# Patient Record
Sex: MALE | Race: WHITE | URBAN - METROPOLITAN AREA
[De-identification: names, ages, dates, MRNs, and addresses within clinical notes are randomized per-mention and may not be internally consistent; named-entity substitution may affect disease eponyms.]

---

## 2017-01-06 DIAGNOSIS — E11.9 TYPE 2 DIABETES MELLITUS WITHOUT COMPLICATION, WITHOUT LONG-TERM CURRENT USE OF INSULIN (H): Primary | ICD-10-CM

## 2017-01-06 RX ORDER — LISINOPRIL 10 MG/1
10 TABLET ORAL DAILY
Qty: 30 TABLET | Refills: 0 | Status: SHIPPED | OUTPATIENT
Start: 2017-01-06

## 2017-04-23 DIAGNOSIS — E11.9 TYPE 2 DIABETES MELLITUS WITHOUT COMPLICATION, WITHOUT LONG-TERM CURRENT USE OF INSULIN (H): ICD-10-CM

## 2017-04-25 RX ORDER — GLIMEPIRIDE 1 MG/1
TABLET ORAL
Qty: 30 TABLET | Refills: 0 | OUTPATIENT
Start: 2017-04-25

## 2017-04-25 NOTE — TELEPHONE ENCOUNTER
glimiperide         Last Written Prescription Date: 12/30/16  Last Fill Quantity: 30, # refills: 0  Last Office Visit with [facility] [place] or [facility] prescribing provider:  1/11/16        BP Readings from Last 3 Encounters:   04/25/16 130/78   04/17/16 129/79   02/12/16 130/80     No results found for: MICROL  No results found for: UMALCR  Creatinine   Date Value Ref Range Status   12/14/2015 0.73 0.66 - 1.25 mg/dL Final   ]  GFR Estimate   Date Value Ref Range Status   12/14/2015 >90  Non  GFR Calc   >60 mL/min/1.7m2 Final     GFR Estimate If Black   Date Value Ref Range Status   12/14/2015 >90   GFR Calc   >60 mL/min/1.7m2 Final     Lab Results   Component Value Date    CHOL 171 12/21/2015     Lab Results   Component Value Date    HDL 47 12/21/2015     Lab Results   Component Value Date    LDL 82 12/21/2015     Lab Results   Component Value Date    TRIG 210 12/21/2015     No results found for: CHOLHDLRATIO  Lab Results   Component Value Date     12/14/2015     Lab Results   Component Value Date     12/14/2015     Lab Results   Component Value Date    A1C 7.7 12/22/2015     Potassium   Date Value Ref Range Status   12/14/2015 4.3 3.4 - 5.3 mmol/L Final

## 2017-04-25 NOTE — TELEPHONE ENCOUNTER
Routing refill request to provider for review/approval because:  Maria R given x1 and patient did not follow up, please advise  Labs not current:  all  A break in medication  Patient needs to be seen because it has been more than 1 year since last office visit.

## 2017-04-25 NOTE — TELEPHONE ENCOUNTER
You have to forward the refill to your station pool so they can call the pharmacy and deny it.  It does not go anywhere if we deny it in the chart only.

## 2018-12-27 ENCOUNTER — RECORDS - HEALTHEAST (OUTPATIENT)
Dept: LAB | Facility: CLINIC | Age: 63
End: 2018-12-27

## 2018-12-27 LAB — INR PPP: 0.88 (ref 0.9–1.1)

## 2023-03-14 NOTE — TELEPHONE ENCOUNTER
Medication is being filled for 1 time refill only due to:  Patient needs to be seen because it has been more than one year since last visit.    
lisinopril (PRINIVIL,ZESTRIL) 10 MG tablet      Last Written Prescription Date: 12/22/2015  Last Fill Quantity: 90, # refills: 3  Last Office Visit with [facility] or [facility] prescribing provider: 1/11/2016       POTASSIUM   Date Value Ref Range Status   12/14/2015 4.3 3.4 - 5.3 mmol/L Final     CREATININE   Date Value Ref Range Status   12/14/2015 0.73 0.66 - 1.25 mg/dL Final     BP Readings from Last 3 Encounters:   04/25/16 130/78   04/17/16 129/79   02/12/16 130/80       
Walk in